# Patient Record
Sex: FEMALE | Race: WHITE | Employment: FULL TIME | URBAN - METROPOLITAN AREA
[De-identification: names, ages, dates, MRNs, and addresses within clinical notes are randomized per-mention and may not be internally consistent; named-entity substitution may affect disease eponyms.]

---

## 2023-08-09 ENCOUNTER — OFFICE VISIT (OUTPATIENT)
Dept: OCCUPATIONAL MEDICINE | Age: 28
End: 2023-08-09

## 2023-08-09 VITALS
HEART RATE: 83 BPM | SYSTOLIC BLOOD PRESSURE: 124 MMHG | DIASTOLIC BLOOD PRESSURE: 84 MMHG | TEMPERATURE: 98.3 F | OXYGEN SATURATION: 99 % | RESPIRATION RATE: 16 BRPM

## 2023-08-09 DIAGNOSIS — R68.89 FLU-LIKE SYMPTOMS: Primary | ICD-10-CM

## 2023-08-09 LAB
GROUP A STREP ANTIGEN, POC: NEGATIVE
INFLUENZA A ANTIGEN, POC: NEGATIVE
INFLUENZA B ANTIGEN, POC: NEGATIVE
SARS-COV-2 RNA, POC: NEGATIVE
VALID INTERNAL CONTROL, POC: YES

## 2023-08-09 ASSESSMENT — ENCOUNTER SYMPTOMS
COUGH: 0
DIARRHEA: 0
SORE THROAT: 1
CHEST TIGHTNESS: 0
SINUS PRESSURE: 1
NAUSEA: 0
EYE PAIN: 0
WHEEZING: 0
RHINORRHEA: 0
ABDOMINAL PAIN: 0
EYE ITCHING: 0
SHORTNESS OF BREATH: 0
EYE DISCHARGE: 0
EYE REDNESS: 0
SINUS PAIN: 0
VOMITING: 0
SWOLLEN GLANDS: 1

## 2024-05-09 ENCOUNTER — OFFICE VISIT (OUTPATIENT)
Age: 29
End: 2024-05-09

## 2024-05-09 VITALS
DIASTOLIC BLOOD PRESSURE: 78 MMHG | TEMPERATURE: 98.8 F | OXYGEN SATURATION: 99 % | SYSTOLIC BLOOD PRESSURE: 110 MMHG | RESPIRATION RATE: 16 BRPM | HEART RATE: 80 BPM

## 2024-05-09 DIAGNOSIS — R05.1 ACUTE COUGH: Primary | ICD-10-CM

## 2024-05-09 RX ORDER — BENZONATATE 100 MG/1
100 CAPSULE ORAL 3 TIMES DAILY PRN
Qty: 20 CAPSULE | Refills: 0 | Status: SHIPPED | OUTPATIENT
Start: 2024-05-09 | End: 2024-05-16

## 2024-05-09 ASSESSMENT — ENCOUNTER SYMPTOMS
SINUS PAIN: 0
SINUS PRESSURE: 0
EYE ITCHING: 0
TROUBLE SWALLOWING: 0
WHEEZING: 0
NAUSEA: 0
CHEST TIGHTNESS: 0
VOMITING: 0
EYE DISCHARGE: 0
SORE THROAT: 0
EYE REDNESS: 0
RHINORRHEA: 0
DIARRHEA: 0
ABDOMINAL PAIN: 0
EYE PAIN: 0
SHORTNESS OF BREATH: 0
COUGH: 1

## 2024-05-09 NOTE — PROGRESS NOTES
PROGRESS NOTE    SUBJECTIVE:   Manjula Nolan is a 28 y.o. female seen for non-productive cough for one and a half weeks. She states that once she starts coughing she is unable to stop. Only additional symptoms reported are postnasal drip and she feels she may have had a fever over the weekend but she did not measure her temperature. She denies sore throat, shortness of breath, itchy watery eyes, sneezing, wheezing, or n/v/d. She denies any sick contacts although her daughter attends day care. She has been taking Mucinex and Delsym with no relief.     Chief Complaint    Cough         Cough  This is a new problem. The current episode started 1 to 4 weeks ago. The cough is Non-productive. Associated symptoms include a fever and postnasal drip. Pertinent negatives include no chest pain, chills, ear congestion, ear pain, eye redness, headaches, myalgias, nasal congestion, rash, rhinorrhea, sore throat, shortness of breath, sweats or wheezing. The symptoms are aggravated by lying down. She has tried OTC cough suppressant (Mucinex and Delsym) for the symptoms. The treatment provided no relief. There is no history of asthma, bronchitis, environmental allergies or pneumonia.       Current Outpatient Medications   Medication Sig Dispense Refill    benzonatate (TESSALON) 100 MG capsule Take 1 capsule by mouth 3 times daily as needed for Cough 20 capsule 0    Multiple Vitamin (MULTIVITAMIN PO) Take by mouth       No current facility-administered medications for this visit.      No Known Allergies    Social History     Tobacco Use    Smoking status: Never    Smokeless tobacco: Never   Substance Use Topics    Alcohol use: Yes    Drug use: No        Review of Systems   Constitutional:  Positive for fever. Negative for chills and fatigue.   HENT:  Positive for postnasal drip. Negative for congestion, ear discharge, ear pain, rhinorrhea, sinus pressure, sinus pain, sneezing, sore throat and trouble swallowing.    Eyes:  Negative

## 2025-01-24 ENCOUNTER — OFFICE VISIT (OUTPATIENT)
Age: 30
End: 2025-01-24

## 2025-01-24 VITALS
DIASTOLIC BLOOD PRESSURE: 80 MMHG | RESPIRATION RATE: 16 BRPM | TEMPERATURE: 97.8 F | HEART RATE: 78 BPM | SYSTOLIC BLOOD PRESSURE: 120 MMHG | OXYGEN SATURATION: 99 %

## 2025-01-24 DIAGNOSIS — J32.9 RHINOSINUSITIS: Primary | ICD-10-CM

## 2025-01-24 LAB
GROUP A STREP ANTIGEN, POC: NEGATIVE
INFLUENZA A ANTIGEN, POC: NEGATIVE
INFLUENZA B ANTIGEN, POC: NEGATIVE
LOT EXPIRE DATE: NORMAL
LOT KIT NUMBER: NORMAL
SARS-COV-2 RNA, POC: NEGATIVE
VALID INTERNAL CONTROL, POC: YES
VALID INTERNAL CONTROL: YES
VENDOR AND KIT NAME POC: NORMAL

## 2025-01-24 RX ORDER — FERROUS SULFATE 325(65) MG
TABLET ORAL
COMMUNITY

## 2025-01-24 ASSESSMENT — ENCOUNTER SYMPTOMS
SINUS PRESSURE: 0
NAUSEA: 0
EYE DISCHARGE: 0
VOMITING: 0
EYE ITCHING: 0
ABDOMINAL PAIN: 0
SORE THROAT: 1
SINUS PAIN: 0
DIARRHEA: 0
EYE PAIN: 0
EYE REDNESS: 0
SWOLLEN GLANDS: 1
WHEEZING: 0
COUGH: 0
RHINORRHEA: 0

## 2025-01-24 NOTE — PROGRESS NOTES
PROGRESS NOTE    SUBJECTIVE:   Manjula Nolan is a 29 y.o. female [facility] for cold symptoms that started yesterday. Reports she is 37 weeks pregnant and wants to ensure that it is \"just a cold\" and desires testing today. Confirms her toddler is sick with similar symptoms as well    Chief Complaint    Cold Symptoms           History provided by:  Patient   used: No    Cold Symptoms   This is a new problem. The current episode started yesterday. The problem has been unchanged. There has been no fever. Associated symptoms include congestion, ear pain, a plugged ear sensation, a sore throat and swollen glands. Pertinent negatives include no abdominal pain, chest pain, coughing, diarrhea, dysuria, headaches, joint pain, joint swelling, nausea, neck pain, rash, rhinorrhea, sinus pain, sneezing, vomiting or wheezing. She has tried nothing for the symptoms.       Current Outpatient Medications   Medication Sig Dispense Refill    ferrous sulfate (IRON 325) 325 (65 Fe) MG tablet Take by mouth      Multiple Vitamin (MULTIVITAMIN PO) Take by mouth       No current facility-administered medications for this visit.      No Known Allergies    Social History     Tobacco Use    Smoking status: Never    Smokeless tobacco: Never   Substance Use Topics    Alcohol use: Yes    Drug use: No        Review of Systems   Constitutional:  Positive for fatigue. Negative for chills and fever.   HENT:  Positive for congestion, ear pain and sore throat. Negative for postnasal drip, rhinorrhea, sinus pressure, sinus pain and sneezing.    Eyes:  Negative for pain, discharge, redness and itching.   Respiratory:  Negative for cough and wheezing.    Cardiovascular:  Negative for chest pain.   Gastrointestinal:  Negative for abdominal pain, diarrhea, nausea and vomiting.   Genitourinary:  Negative for dysuria.   Musculoskeletal:  Negative for arthralgias,